# Patient Record
Sex: FEMALE | Race: WHITE
[De-identification: names, ages, dates, MRNs, and addresses within clinical notes are randomized per-mention and may not be internally consistent; named-entity substitution may affect disease eponyms.]

---

## 2018-07-13 VITALS — SYSTOLIC BLOOD PRESSURE: 176 MMHG | DIASTOLIC BLOOD PRESSURE: 101 MMHG

## 2018-07-18 ENCOUNTER — HOSPITAL ENCOUNTER (OUTPATIENT)
Dept: HOSPITAL 8 - OUT | Age: 58
Setting detail: OBSERVATION
LOS: 2 days | Discharge: HOME | End: 2018-07-20
Attending: SURGERY | Admitting: SURGERY
Payer: COMMERCIAL

## 2018-07-18 VITALS — HEIGHT: 68 IN | BODY MASS INDEX: 33.28 KG/M2 | WEIGHT: 219.58 LBS

## 2018-07-18 VITALS — DIASTOLIC BLOOD PRESSURE: 86 MMHG | SYSTOLIC BLOOD PRESSURE: 139 MMHG

## 2018-07-18 DIAGNOSIS — K44.9: Primary | ICD-10-CM

## 2018-07-18 DIAGNOSIS — R13.10: ICD-10-CM

## 2018-07-18 PROCEDURE — 96375 TX/PRO/DX INJ NEW DRUG ADDON: CPT

## 2018-07-18 PROCEDURE — S0028 INJECTION, FAMOTIDINE, 20 MG: HCPCS

## 2018-07-18 PROCEDURE — 96374 THER/PROPH/DIAG INJ IV PUSH: CPT

## 2018-07-18 PROCEDURE — 96372 THER/PROPH/DIAG INJ SC/IM: CPT

## 2018-07-18 PROCEDURE — G0378 HOSPITAL OBSERVATION PER HR: HCPCS

## 2018-07-18 PROCEDURE — 43282 LAP PARAESOPH HER RPR W/MESH: CPT

## 2018-07-18 PROCEDURE — 96376 TX/PRO/DX INJ SAME DRUG ADON: CPT

## 2018-07-18 RX ADMIN — PRAMIPEXOLE DIHYDROCHLORIDE SCH MG: 0.5 TABLET ORAL at 21:43

## 2018-07-18 RX ADMIN — HYDRALAZINE HYDROCHLORIDE PRN MG: 20 INJECTION INTRAMUSCULAR; INTRAVENOUS at 16:25

## 2018-07-18 RX ADMIN — SODIUM CHLORIDE, SODIUM LACTATE, POTASSIUM CHLORIDE, AND CALCIUM CHLORIDE SCH MLS/HR: .6; .31; .03; .02 INJECTION, SOLUTION INTRAVENOUS at 21:48

## 2018-07-18 RX ADMIN — HYDROMORPHONE HYDROCHLORIDE PRN MG: 1 INJECTION, SOLUTION INTRAMUSCULAR; INTRAVENOUS; SUBCUTANEOUS at 21:41

## 2018-07-18 RX ADMIN — GABAPENTIN SCH MG: 300 CAPSULE ORAL at 21:47

## 2018-07-18 RX ADMIN — FAMOTIDINE SCH MG: 10 INJECTION INTRAVENOUS at 21:41

## 2018-07-18 RX ADMIN — HYDRALAZINE HYDROCHLORIDE PRN MG: 20 INJECTION INTRAMUSCULAR; INTRAVENOUS at 16:43

## 2018-07-19 VITALS — DIASTOLIC BLOOD PRESSURE: 87 MMHG | SYSTOLIC BLOOD PRESSURE: 141 MMHG

## 2018-07-19 VITALS — DIASTOLIC BLOOD PRESSURE: 83 MMHG | SYSTOLIC BLOOD PRESSURE: 133 MMHG

## 2018-07-19 VITALS — DIASTOLIC BLOOD PRESSURE: 66 MMHG | SYSTOLIC BLOOD PRESSURE: 130 MMHG

## 2018-07-19 VITALS — SYSTOLIC BLOOD PRESSURE: 140 MMHG | DIASTOLIC BLOOD PRESSURE: 78 MMHG

## 2018-07-19 VITALS — SYSTOLIC BLOOD PRESSURE: 147 MMHG | DIASTOLIC BLOOD PRESSURE: 73 MMHG

## 2018-07-19 RX ADMIN — SODIUM CHLORIDE, SODIUM LACTATE, POTASSIUM CHLORIDE, AND CALCIUM CHLORIDE SCH MLS/HR: .6; .31; .03; .02 INJECTION, SOLUTION INTRAVENOUS at 21:59

## 2018-07-19 RX ADMIN — SODIUM CHLORIDE, SODIUM LACTATE, POTASSIUM CHLORIDE, AND CALCIUM CHLORIDE SCH MLS/HR: .6; .31; .03; .02 INJECTION, SOLUTION INTRAVENOUS at 13:30

## 2018-07-19 RX ADMIN — GABAPENTIN SCH MG: 300 CAPSULE ORAL at 21:58

## 2018-07-19 RX ADMIN — KETOROLAC TROMETHAMINE SCH MG: 30 INJECTION, SOLUTION INTRAMUSCULAR at 08:51

## 2018-07-19 RX ADMIN — FAMOTIDINE SCH MG: 10 INJECTION INTRAVENOUS at 21:58

## 2018-07-19 RX ADMIN — KETOROLAC TROMETHAMINE SCH MG: 30 INJECTION, SOLUTION INTRAMUSCULAR at 21:58

## 2018-07-19 RX ADMIN — HYDROMORPHONE HYDROCHLORIDE PRN MG: 1 INJECTION, SOLUTION INTRAMUSCULAR; INTRAVENOUS; SUBCUTANEOUS at 02:09

## 2018-07-19 RX ADMIN — HYDROMORPHONE HYDROCHLORIDE PRN MG: 2 TABLET ORAL at 17:24

## 2018-07-19 RX ADMIN — HYDROMORPHONE HYDROCHLORIDE PRN MG: 2 TABLET ORAL at 12:24

## 2018-07-19 RX ADMIN — ONDANSETRON PRN MG: 2 INJECTION, SOLUTION INTRAMUSCULAR; INTRAVENOUS at 05:37

## 2018-07-19 RX ADMIN — HYDROMORPHONE HYDROCHLORIDE PRN MG: 2 TABLET ORAL at 21:58

## 2018-07-19 RX ADMIN — ENOXAPARIN SODIUM SCH MG: 40 INJECTION SUBCUTANEOUS at 08:39

## 2018-07-19 RX ADMIN — HYDROMORPHONE HYDROCHLORIDE PRN MG: 2 TABLET ORAL at 08:37

## 2018-07-19 RX ADMIN — FAMOTIDINE SCH MG: 10 INJECTION INTRAVENOUS at 08:38

## 2018-07-19 RX ADMIN — PRAMIPEXOLE DIHYDROCHLORIDE SCH MG: 0.5 TABLET ORAL at 21:58

## 2018-07-19 RX ADMIN — SODIUM CHLORIDE, SODIUM LACTATE, POTASSIUM CHLORIDE, AND CALCIUM CHLORIDE SCH MLS/HR: .6; .31; .03; .02 INJECTION, SOLUTION INTRAVENOUS at 05:30

## 2018-07-19 RX ADMIN — KETOROLAC TROMETHAMINE SCH MG: 30 INJECTION, SOLUTION INTRAMUSCULAR at 14:47

## 2018-07-19 RX ADMIN — ONDANSETRON PRN MG: 2 INJECTION, SOLUTION INTRAMUSCULAR; INTRAVENOUS at 17:23

## 2018-07-20 VITALS — DIASTOLIC BLOOD PRESSURE: 65 MMHG | SYSTOLIC BLOOD PRESSURE: 120 MMHG

## 2018-07-20 VITALS — DIASTOLIC BLOOD PRESSURE: 86 MMHG | SYSTOLIC BLOOD PRESSURE: 136 MMHG

## 2018-07-20 RX ADMIN — ONDANSETRON PRN MG: 2 INJECTION, SOLUTION INTRAMUSCULAR; INTRAVENOUS at 07:40

## 2018-07-20 RX ADMIN — KETOROLAC TROMETHAMINE SCH MG: 30 INJECTION, SOLUTION INTRAMUSCULAR at 09:50

## 2018-07-20 RX ADMIN — KETOROLAC TROMETHAMINE SCH MG: 30 INJECTION, SOLUTION INTRAMUSCULAR at 03:14

## 2018-07-20 RX ADMIN — HYDROMORPHONE HYDROCHLORIDE PRN MG: 2 TABLET ORAL at 06:01

## 2018-07-20 RX ADMIN — SODIUM CHLORIDE, SODIUM LACTATE, POTASSIUM CHLORIDE, AND CALCIUM CHLORIDE SCH MLS/HR: .6; .31; .03; .02 INJECTION, SOLUTION INTRAVENOUS at 05:30

## 2018-07-20 RX ADMIN — ENOXAPARIN SODIUM SCH MG: 40 INJECTION SUBCUTANEOUS at 09:53

## 2018-07-20 RX ADMIN — FAMOTIDINE SCH MG: 10 INJECTION INTRAVENOUS at 09:54

## 2018-07-24 ENCOUNTER — HOSPITAL ENCOUNTER (OUTPATIENT)
Dept: HOSPITAL 8 - RAD | Age: 58
Discharge: HOME | End: 2018-07-24
Attending: SURGERY
Payer: COMMERCIAL

## 2018-07-24 DIAGNOSIS — J98.11: Primary | ICD-10-CM

## 2018-07-24 DIAGNOSIS — K57.30: ICD-10-CM

## 2018-07-24 PROCEDURE — 74241: CPT

## 2019-05-10 ENCOUNTER — HOSPITAL ENCOUNTER (OUTPATIENT)
Dept: RADIOLOGY | Facility: MEDICAL CENTER | Age: 59
End: 2019-05-10

## 2019-05-14 ENCOUNTER — ANESTHESIA EVENT (OUTPATIENT)
Dept: SURGERY | Facility: MEDICAL CENTER | Age: 59
End: 2019-05-14
Payer: COMMERCIAL

## 2019-05-15 ENCOUNTER — HOSPITAL ENCOUNTER (OUTPATIENT)
Facility: MEDICAL CENTER | Age: 59
End: 2019-05-16
Attending: SURGERY | Admitting: SURGERY
Payer: COMMERCIAL

## 2019-05-15 ENCOUNTER — ANESTHESIA (OUTPATIENT)
Dept: SURGERY | Facility: MEDICAL CENTER | Age: 59
End: 2019-05-15
Payer: COMMERCIAL

## 2019-05-15 DIAGNOSIS — G89.18 POSTOPERATIVE PAIN: ICD-10-CM

## 2019-05-15 LAB
ABO + RH BLD: NORMAL
ABO GROUP BLD: NORMAL
ANION GAP SERPL CALC-SCNC: 8 MMOL/L (ref 0–11.9)
BLD GP AB SCN SERPL QL: NORMAL
BUN SERPL-MCNC: 20 MG/DL (ref 8–22)
CALCIUM SERPL-MCNC: 9.2 MG/DL (ref 8.5–10.5)
CHLORIDE SERPL-SCNC: 107 MMOL/L (ref 96–112)
CO2 SERPL-SCNC: 27 MMOL/L (ref 20–33)
CREAT SERPL-MCNC: 1.06 MG/DL (ref 0.5–1.4)
ERYTHROCYTE [DISTWIDTH] IN BLOOD BY AUTOMATED COUNT: 41.5 FL (ref 35.9–50)
GLUCOSE SERPL-MCNC: 115 MG/DL (ref 65–99)
HCT VFR BLD AUTO: 41.4 % (ref 37–47)
HGB BLD-MCNC: 14.2 G/DL (ref 12–16)
MCH RBC QN AUTO: 31.5 PG (ref 27–33)
MCHC RBC AUTO-ENTMCNC: 34.3 G/DL (ref 33.6–35)
MCV RBC AUTO: 91.8 FL (ref 81.4–97.8)
PLATELET # BLD AUTO: 177 K/UL (ref 164–446)
PMV BLD AUTO: 11.1 FL (ref 9–12.9)
POTASSIUM SERPL-SCNC: 3.3 MMOL/L (ref 3.6–5.5)
RBC # BLD AUTO: 4.51 M/UL (ref 4.2–5.4)
RH BLD: NORMAL
SODIUM SERPL-SCNC: 142 MMOL/L (ref 135–145)
WBC # BLD AUTO: 7.1 K/UL (ref 4.8–10.8)

## 2019-05-15 PROCEDURE — 86850 RBC ANTIBODY SCREEN: CPT

## 2019-05-15 PROCEDURE — 96374 THER/PROPH/DIAG INJ IV PUSH: CPT

## 2019-05-15 PROCEDURE — 502571 HCHG PACK, LAP CHOLE: Performed by: SURGERY

## 2019-05-15 PROCEDURE — 160036 HCHG PACU - EA ADDL 30 MINS PHASE I: Performed by: SURGERY

## 2019-05-15 PROCEDURE — 700105 HCHG RX REV CODE 258: Performed by: PHYSICIAN ASSISTANT

## 2019-05-15 PROCEDURE — 502240 HCHG MISC OR SUPPLY RC 0272: Performed by: SURGERY

## 2019-05-15 PROCEDURE — C1781 MESH (IMPLANTABLE): HCPCS | Performed by: SURGERY

## 2019-05-15 PROCEDURE — 500868 HCHG NEEDLE, SURGI(VARES): Performed by: SURGERY

## 2019-05-15 PROCEDURE — 160048 HCHG OR STATISTICAL LEVEL 1-5: Performed by: SURGERY

## 2019-05-15 PROCEDURE — 86900 BLOOD TYPING SEROLOGIC ABO: CPT

## 2019-05-15 PROCEDURE — G0378 HOSPITAL OBSERVATION PER HR: HCPCS

## 2019-05-15 PROCEDURE — 86901 BLOOD TYPING SEROLOGIC RH(D): CPT

## 2019-05-15 PROCEDURE — 700101 HCHG RX REV CODE 250: Performed by: ANESTHESIOLOGY

## 2019-05-15 PROCEDURE — 96375 TX/PRO/DX INJ NEW DRUG ADDON: CPT

## 2019-05-15 PROCEDURE — 700111 HCHG RX REV CODE 636 W/ 250 OVERRIDE (IP): Performed by: PHYSICIAN ASSISTANT

## 2019-05-15 PROCEDURE — 36415 COLL VENOUS BLD VENIPUNCTURE: CPT

## 2019-05-15 PROCEDURE — 700105 HCHG RX REV CODE 258: Performed by: SURGERY

## 2019-05-15 PROCEDURE — 501570 HCHG TROCAR, SEPARATOR: Performed by: SURGERY

## 2019-05-15 PROCEDURE — 160041 HCHG SURGERY MINUTES - EA ADDL 1 MIN LEVEL 4: Performed by: SURGERY

## 2019-05-15 PROCEDURE — 93005 ELECTROCARDIOGRAM TRACING: CPT | Performed by: SURGERY

## 2019-05-15 PROCEDURE — 501577 HCHG TROCAR, STEP 11MM: Performed by: SURGERY

## 2019-05-15 PROCEDURE — 160002 HCHG RECOVERY MINUTES (STAT): Performed by: SURGERY

## 2019-05-15 PROCEDURE — 501664 HCHG TUBING, FILTER STRYKER: Performed by: SURGERY

## 2019-05-15 PROCEDURE — 700102 HCHG RX REV CODE 250 W/ 637 OVERRIDE(OP): Performed by: PHYSICIAN ASSISTANT

## 2019-05-15 PROCEDURE — 85027 COMPLETE CBC AUTOMATED: CPT

## 2019-05-15 PROCEDURE — 500522 HCHG ENDOSTITCH SUTURING DEVICE: Performed by: SURGERY

## 2019-05-15 PROCEDURE — 160035 HCHG PACU - 1ST 60 MINS PHASE I: Performed by: SURGERY

## 2019-05-15 PROCEDURE — 160029 HCHG SURGERY MINUTES - 1ST 30 MINS LEVEL 4: Performed by: SURGERY

## 2019-05-15 PROCEDURE — 501583 HCHG TROCAR, THRD CAN&SEAL 5X100: Performed by: SURGERY

## 2019-05-15 PROCEDURE — A6402 STERILE GAUZE <= 16 SQ IN: HCPCS | Performed by: SURGERY

## 2019-05-15 PROCEDURE — 96376 TX/PRO/DX INJ SAME DRUG ADON: CPT

## 2019-05-15 PROCEDURE — 700111 HCHG RX REV CODE 636 W/ 250 OVERRIDE (IP): Performed by: ANESTHESIOLOGY

## 2019-05-15 PROCEDURE — A9270 NON-COVERED ITEM OR SERVICE: HCPCS | Performed by: PHYSICIAN ASSISTANT

## 2019-05-15 PROCEDURE — 80048 BASIC METABOLIC PNL TOTAL CA: CPT

## 2019-05-15 PROCEDURE — 500521 HCHG ENDOSTITCH LOAD UNIT: Performed by: SURGERY

## 2019-05-15 PROCEDURE — 501838 HCHG SUTURE GENERAL: Performed by: SURGERY

## 2019-05-15 PROCEDURE — 160009 HCHG ANES TIME/MIN: Performed by: SURGERY

## 2019-05-15 DEVICE — MESH SURGICAL PHASIX SEPRA 7X10CM: Type: IMPLANTABLE DEVICE | Status: FUNCTIONAL

## 2019-05-15 RX ORDER — ZOLPIDEM TARTRATE 10 MG/1
10 TABLET ORAL
COMMUNITY

## 2019-05-15 RX ORDER — GABAPENTIN 300 MG/1
600 CAPSULE ORAL
COMMUNITY

## 2019-05-15 RX ORDER — GABAPENTIN 300 MG/1
600 CAPSULE ORAL
Status: DISCONTINUED | OUTPATIENT
Start: 2019-05-15 | End: 2019-05-16 | Stop reason: HOSPADM

## 2019-05-15 RX ORDER — FAMOTIDINE 20 MG/1
20 TABLET, FILM COATED ORAL 2 TIMES DAILY
Status: DISCONTINUED | OUTPATIENT
Start: 2019-05-15 | End: 2019-05-16 | Stop reason: HOSPADM

## 2019-05-15 RX ORDER — DIPHENHYDRAMINE HYDROCHLORIDE 50 MG/ML
12.5 INJECTION INTRAMUSCULAR; INTRAVENOUS
Status: DISCONTINUED | OUTPATIENT
Start: 2019-05-15 | End: 2019-05-15 | Stop reason: HOSPADM

## 2019-05-15 RX ORDER — HYDRALAZINE HYDROCHLORIDE 20 MG/ML
10 INJECTION INTRAMUSCULAR; INTRAVENOUS EVERY 6 HOURS PRN
Status: DISCONTINUED | OUTPATIENT
Start: 2019-05-15 | End: 2019-05-16 | Stop reason: HOSPADM

## 2019-05-15 RX ORDER — LORAZEPAM 2 MG/ML
0.5 INJECTION INTRAMUSCULAR
Status: DISCONTINUED | OUTPATIENT
Start: 2019-05-15 | End: 2019-05-15 | Stop reason: HOSPADM

## 2019-05-15 RX ORDER — HALOPERIDOL 5 MG/ML
1 INJECTION INTRAMUSCULAR
Status: DISCONTINUED | OUTPATIENT
Start: 2019-05-15 | End: 2019-05-15 | Stop reason: HOSPADM

## 2019-05-15 RX ORDER — ACETAMINOPHEN 10 MG/ML
1 INJECTION, SOLUTION INTRAVENOUS ONCE
Status: COMPLETED | OUTPATIENT
Start: 2019-05-15 | End: 2019-05-15

## 2019-05-15 RX ORDER — SODIUM CHLORIDE, SODIUM LACTATE, POTASSIUM CHLORIDE, CALCIUM CHLORIDE 600; 310; 30; 20 MG/100ML; MG/100ML; MG/100ML; MG/100ML
INJECTION, SOLUTION INTRAVENOUS CONTINUOUS
Status: DISCONTINUED | OUTPATIENT
Start: 2019-05-15 | End: 2019-05-15 | Stop reason: HOSPADM

## 2019-05-15 RX ORDER — HYDROMORPHONE HYDROCHLORIDE 1 MG/ML
0.4 INJECTION, SOLUTION INTRAMUSCULAR; INTRAVENOUS; SUBCUTANEOUS
Status: DISCONTINUED | OUTPATIENT
Start: 2019-05-15 | End: 2019-05-15 | Stop reason: HOSPADM

## 2019-05-15 RX ORDER — MORPHINE SULFATE 10 MG/ML
1-5 INJECTION, SOLUTION INTRAMUSCULAR; INTRAVENOUS
Status: DISCONTINUED | OUTPATIENT
Start: 2019-05-15 | End: 2019-05-16 | Stop reason: HOSPADM

## 2019-05-15 RX ORDER — PRAMIPEXOLE DIHYDROCHLORIDE 1 MG/1
2 TABLET ORAL
COMMUNITY

## 2019-05-15 RX ORDER — ENALAPRILAT 1.25 MG/ML
2.5 INJECTION INTRAVENOUS EVERY 6 HOURS PRN
Status: DISCONTINUED | OUTPATIENT
Start: 2019-05-15 | End: 2019-05-16 | Stop reason: HOSPADM

## 2019-05-15 RX ORDER — ONDANSETRON 2 MG/ML
INJECTION INTRAMUSCULAR; INTRAVENOUS PRN
Status: DISCONTINUED | OUTPATIENT
Start: 2019-05-15 | End: 2019-05-15 | Stop reason: SURG

## 2019-05-15 RX ORDER — ONDANSETRON 4 MG/1
4 TABLET, ORALLY DISINTEGRATING ORAL EVERY 4 HOURS PRN
Status: DISCONTINUED | OUTPATIENT
Start: 2019-05-15 | End: 2019-05-16 | Stop reason: HOSPADM

## 2019-05-15 RX ORDER — DEXMEDETOMIDINE HYDROCHLORIDE 100 UG/ML
INJECTION, SOLUTION INTRAVENOUS PRN
Status: DISCONTINUED | OUTPATIENT
Start: 2019-05-15 | End: 2019-05-15 | Stop reason: SURG

## 2019-05-15 RX ORDER — BUPIVACAINE HYDROCHLORIDE AND EPINEPHRINE 5; 5 MG/ML; UG/ML
INJECTION, SOLUTION EPIDURAL; INTRACAUDAL; PERINEURAL
Status: DISCONTINUED | OUTPATIENT
Start: 2019-05-15 | End: 2019-05-15 | Stop reason: HOSPADM

## 2019-05-15 RX ORDER — SODIUM CHLORIDE, SODIUM LACTATE, POTASSIUM CHLORIDE, CALCIUM CHLORIDE 600; 310; 30; 20 MG/100ML; MG/100ML; MG/100ML; MG/100ML
INJECTION, SOLUTION INTRAVENOUS CONTINUOUS
Status: DISCONTINUED | OUTPATIENT
Start: 2019-05-15 | End: 2019-05-15

## 2019-05-15 RX ORDER — PRAMIPEXOLE DIHYDROCHLORIDE 0.5 MG/1
2 TABLET ORAL
Status: DISCONTINUED | OUTPATIENT
Start: 2019-05-15 | End: 2019-05-16 | Stop reason: HOSPADM

## 2019-05-15 RX ORDER — HYDROMORPHONE HYDROCHLORIDE 1 MG/ML
0.1 INJECTION, SOLUTION INTRAMUSCULAR; INTRAVENOUS; SUBCUTANEOUS
Status: DISCONTINUED | OUTPATIENT
Start: 2019-05-15 | End: 2019-05-15 | Stop reason: HOSPADM

## 2019-05-15 RX ORDER — ACETAMINOPHEN 10 MG/ML
INJECTION, SOLUTION INTRAVENOUS PRN
Status: DISCONTINUED | OUTPATIENT
Start: 2019-05-15 | End: 2019-05-15 | Stop reason: SURG

## 2019-05-15 RX ORDER — SODIUM CHLORIDE, SODIUM LACTATE, POTASSIUM CHLORIDE, CALCIUM CHLORIDE 600; 310; 30; 20 MG/100ML; MG/100ML; MG/100ML; MG/100ML
INJECTION, SOLUTION INTRAVENOUS
Status: COMPLETED | OUTPATIENT
Start: 2019-05-15 | End: 2019-05-15

## 2019-05-15 RX ORDER — ONDANSETRON 2 MG/ML
4 INJECTION INTRAMUSCULAR; INTRAVENOUS
Status: COMPLETED | OUTPATIENT
Start: 2019-05-15 | End: 2019-05-15

## 2019-05-15 RX ORDER — DIPHENHYDRAMINE HYDROCHLORIDE 50 MG/ML
25 INJECTION INTRAMUSCULAR; INTRAVENOUS EVERY 6 HOURS PRN
Status: DISCONTINUED | OUTPATIENT
Start: 2019-05-15 | End: 2019-05-16 | Stop reason: HOSPADM

## 2019-05-15 RX ORDER — HYDROMORPHONE HYDROCHLORIDE 1 MG/ML
0.2 INJECTION, SOLUTION INTRAMUSCULAR; INTRAVENOUS; SUBCUTANEOUS
Status: DISCONTINUED | OUTPATIENT
Start: 2019-05-15 | End: 2019-05-15 | Stop reason: HOSPADM

## 2019-05-15 RX ORDER — LORAZEPAM 2 MG/ML
.5-1 INJECTION INTRAMUSCULAR EVERY 4 HOURS PRN
Status: DISCONTINUED | OUTPATIENT
Start: 2019-05-15 | End: 2019-05-16 | Stop reason: HOSPADM

## 2019-05-15 RX ORDER — LOSARTAN POTASSIUM AND HYDROCHLOROTHIAZIDE 12.5; 5 MG/1; MG/1
1 TABLET ORAL EVERY EVENING
COMMUNITY

## 2019-05-15 RX ORDER — ONDANSETRON 2 MG/ML
4 INJECTION INTRAMUSCULAR; INTRAVENOUS EVERY 4 HOURS PRN
Status: DISCONTINUED | OUTPATIENT
Start: 2019-05-15 | End: 2019-05-16 | Stop reason: HOSPADM

## 2019-05-15 RX ORDER — SCOLOPAMINE TRANSDERMAL SYSTEM 1 MG/1
1 PATCH, EXTENDED RELEASE TRANSDERMAL
Status: DISCONTINUED | OUTPATIENT
Start: 2019-05-15 | End: 2019-05-16 | Stop reason: HOSPADM

## 2019-05-15 RX ORDER — CEFAZOLIN SODIUM 1 G/3ML
INJECTION, POWDER, FOR SOLUTION INTRAMUSCULAR; INTRAVENOUS PRN
Status: DISCONTINUED | OUTPATIENT
Start: 2019-05-15 | End: 2019-05-15 | Stop reason: SURG

## 2019-05-15 RX ORDER — HYDROMORPHONE HYDROCHLORIDE 2 MG/ML
INJECTION, SOLUTION INTRAMUSCULAR; INTRAVENOUS; SUBCUTANEOUS PRN
Status: DISCONTINUED | OUTPATIENT
Start: 2019-05-15 | End: 2019-05-15 | Stop reason: SURG

## 2019-05-15 RX ORDER — DEXAMETHASONE SODIUM PHOSPHATE 4 MG/ML
INJECTION, SOLUTION INTRA-ARTICULAR; INTRALESIONAL; INTRAMUSCULAR; INTRAVENOUS; SOFT TISSUE PRN
Status: DISCONTINUED | OUTPATIENT
Start: 2019-05-15 | End: 2019-05-15 | Stop reason: SURG

## 2019-05-15 RX ORDER — SODIUM CHLORIDE, SODIUM LACTATE, POTASSIUM CHLORIDE, CALCIUM CHLORIDE 600; 310; 30; 20 MG/100ML; MG/100ML; MG/100ML; MG/100ML
INJECTION, SOLUTION INTRAVENOUS CONTINUOUS
Status: DISCONTINUED | OUTPATIENT
Start: 2019-05-15 | End: 2019-05-16 | Stop reason: HOSPADM

## 2019-05-15 RX ORDER — HYDROMORPHONE HYDROCHLORIDE 2 MG/ML
2 INJECTION, SOLUTION INTRAMUSCULAR; INTRAVENOUS; SUBCUTANEOUS
Status: ON HOLD | COMMUNITY
End: 2019-05-15

## 2019-05-15 RX ORDER — PROMETHAZINE HYDROCHLORIDE 25 MG/1
25 SUPPOSITORY RECTAL EVERY 6 HOURS PRN
Status: DISCONTINUED | OUTPATIENT
Start: 2019-05-15 | End: 2019-05-16 | Stop reason: HOSPADM

## 2019-05-15 RX ADMIN — SODIUM CHLORIDE, POTASSIUM CHLORIDE, SODIUM LACTATE AND CALCIUM CHLORIDE: 600; 310; 30; 20 INJECTION, SOLUTION INTRAVENOUS at 14:13

## 2019-05-15 RX ADMIN — DEXMEDETOMIDINE HYDROCHLORIDE 10 MCG: 100 INJECTION, SOLUTION INTRAVENOUS at 15:18

## 2019-05-15 RX ADMIN — HYDROCODONE BITARTRATE AND ACETAMINOPHEN 20 ML: 7.5; 325 SOLUTION ORAL at 20:14

## 2019-05-15 RX ADMIN — HYDROMORPHONE HYDROCHLORIDE 1 MG: 2 INJECTION, SOLUTION INTRAMUSCULAR; INTRAVENOUS; SUBCUTANEOUS at 15:22

## 2019-05-15 RX ADMIN — ONDANSETRON 4 MG: 2 INJECTION INTRAMUSCULAR; INTRAVENOUS at 15:51

## 2019-05-15 RX ADMIN — MIDAZOLAM HYDROCHLORIDE 2 MG: 1 INJECTION, SOLUTION INTRAMUSCULAR; INTRAVENOUS at 14:09

## 2019-05-15 RX ADMIN — FENTANYL CITRATE 50 MCG: 50 INJECTION, SOLUTION INTRAMUSCULAR; INTRAVENOUS at 14:35

## 2019-05-15 RX ADMIN — ROCURONIUM BROMIDE 20 MG: 10 INJECTION, SOLUTION INTRAVENOUS at 15:25

## 2019-05-15 RX ADMIN — DEXAMETHASONE SODIUM PHOSPHATE 8 MG: 4 INJECTION, SOLUTION INTRA-ARTICULAR; INTRALESIONAL; INTRAMUSCULAR; INTRAVENOUS; SOFT TISSUE at 14:45

## 2019-05-15 RX ADMIN — FENTANYL CITRATE 100 MCG: 50 INJECTION, SOLUTION INTRAMUSCULAR; INTRAVENOUS at 14:17

## 2019-05-15 RX ADMIN — PROPOFOL 100 MG: 10 INJECTION, EMULSION INTRAVENOUS at 14:50

## 2019-05-15 RX ADMIN — HYDROMORPHONE HYDROCHLORIDE 0.4 MG: 1 INJECTION, SOLUTION INTRAMUSCULAR; INTRAVENOUS; SUBCUTANEOUS at 16:47

## 2019-05-15 RX ADMIN — ONDANSETRON 4 MG: 2 INJECTION INTRAMUSCULAR; INTRAVENOUS at 16:47

## 2019-05-15 RX ADMIN — CEFAZOLIN 2 G: 330 INJECTION, POWDER, FOR SOLUTION INTRAMUSCULAR; INTRAVENOUS at 14:22

## 2019-05-15 RX ADMIN — GABAPENTIN 600 MG: 300 CAPSULE ORAL at 20:15

## 2019-05-15 RX ADMIN — FAMOTIDINE 20 MG: 10 INJECTION INTRAVENOUS at 18:23

## 2019-05-15 RX ADMIN — MORPHINE SULFATE 5 MG: 10 INJECTION INTRAVENOUS at 22:53

## 2019-05-15 RX ADMIN — DEXMEDETOMIDINE HYDROCHLORIDE 5 MCG: 100 INJECTION, SOLUTION INTRAVENOUS at 15:35

## 2019-05-15 RX ADMIN — EPHEDRINE SULFATE 10 MG: 50 INJECTION INTRAMUSCULAR; INTRAVENOUS; SUBCUTANEOUS at 14:25

## 2019-05-15 RX ADMIN — SODIUM CHLORIDE, POTASSIUM CHLORIDE, SODIUM LACTATE AND CALCIUM CHLORIDE: 600; 310; 30; 20 INJECTION, SOLUTION INTRAVENOUS at 18:28

## 2019-05-15 RX ADMIN — ACETAMINOPHEN 1 G: 10 INJECTION, SOLUTION INTRAVENOUS at 14:30

## 2019-05-15 RX ADMIN — FENTANYL CITRATE 50 MCG: 50 INJECTION, SOLUTION INTRAMUSCULAR; INTRAVENOUS at 15:00

## 2019-05-15 RX ADMIN — PRAMIPEXOLE DIHYDROCHLORIDE 2 MG: 0.5 TABLET ORAL at 20:15

## 2019-05-15 RX ADMIN — ACETAMINOPHEN 1 G: 10 INJECTION, SOLUTION INTRAVENOUS at 12:58

## 2019-05-15 RX ADMIN — LIDOCAINE HYDROCHLORIDE 60 MG: 20 INJECTION, SOLUTION INTRAVENOUS at 14:17

## 2019-05-15 RX ADMIN — LORAZEPAM 1 MG: 2 INJECTION INTRAMUSCULAR at 21:34

## 2019-05-15 RX ADMIN — PROPOFOL 200 MG: 10 INJECTION, EMULSION INTRAVENOUS at 14:17

## 2019-05-15 RX ADMIN — HYDROMORPHONE HYDROCHLORIDE 0.2 MG: 1 INJECTION, SOLUTION INTRAMUSCULAR; INTRAVENOUS; SUBCUTANEOUS at 16:53

## 2019-05-15 RX ADMIN — SUGAMMADEX 200 MG: 100 INJECTION, SOLUTION INTRAVENOUS at 16:00

## 2019-05-15 RX ADMIN — ROCURONIUM BROMIDE 50 MG: 10 INJECTION, SOLUTION INTRAVENOUS at 14:17

## 2019-05-15 RX ADMIN — MORPHINE SULFATE 5 MG: 10 INJECTION INTRAVENOUS at 18:24

## 2019-05-15 RX ADMIN — FENTANYL CITRATE 50 MCG: 50 INJECTION, SOLUTION INTRAMUSCULAR; INTRAVENOUS at 15:10

## 2019-05-15 RX ADMIN — DIPHENHYDRAMINE HYDROCHLORIDE 25 MG: 50 INJECTION INTRAMUSCULAR; INTRAVENOUS at 18:23

## 2019-05-15 RX ADMIN — SODIUM CHLORIDE, POTASSIUM CHLORIDE, SODIUM LACTATE AND CALCIUM CHLORIDE: 600; 310; 30; 20 INJECTION, SOLUTION INTRAVENOUS at 12:00

## 2019-05-15 RX ADMIN — DEXMEDETOMIDINE HYDROCHLORIDE 10 MCG: 100 INJECTION, SOLUTION INTRAVENOUS at 15:25

## 2019-05-15 RX ADMIN — HYDROMORPHONE HYDROCHLORIDE 0.4 MG: 1 INJECTION, SOLUTION INTRAMUSCULAR; INTRAVENOUS; SUBCUTANEOUS at 16:42

## 2019-05-15 ASSESSMENT — COGNITIVE AND FUNCTIONAL STATUS - GENERAL
SUGGESTED CMS G CODE MODIFIER MOBILITY: CK
HELP NEEDED FOR BATHING: A LITTLE
STANDING UP FROM CHAIR USING ARMS: A LITTLE
MOBILITY SCORE: 18
WALKING IN HOSPITAL ROOM: A LITTLE
SUGGESTED CMS G CODE MODIFIER DAILY ACTIVITY: CK
CLIMB 3 TO 5 STEPS WITH RAILING: A LITTLE
MOVING TO AND FROM BED TO CHAIR: A LITTLE
MOVING FROM LYING ON BACK TO SITTING ON SIDE OF FLAT BED: A LITTLE
DAILY ACTIVITIY SCORE: 18
TURNING FROM BACK TO SIDE WHILE IN FLAT BAD: A LITTLE
DRESSING REGULAR UPPER BODY CLOTHING: A LITTLE
EATING MEALS: A LITTLE
TOILETING: A LITTLE
DRESSING REGULAR LOWER BODY CLOTHING: A LITTLE
PERSONAL GROOMING: A LITTLE

## 2019-05-15 ASSESSMENT — PATIENT HEALTH QUESTIONNAIRE - PHQ9
1. LITTLE INTEREST OR PLEASURE IN DOING THINGS: NOT AT ALL
SUM OF ALL RESPONSES TO PHQ9 QUESTIONS 1 AND 2: 0
2. FEELING DOWN, DEPRESSED, IRRITABLE, OR HOPELESS: NOT AT ALL

## 2019-05-15 ASSESSMENT — LIFESTYLE VARIABLES
ALCOHOL_USE: NO
EVER_SMOKED: YES

## 2019-05-15 ASSESSMENT — PAIN SCALES - GENERAL: PAIN_LEVEL: 5

## 2019-05-15 NOTE — ANESTHESIA PROCEDURE NOTES
Airway  Date/Time: 5/15/2019 2:24 PM  Performed by: JULIANE NELSON  Authorized by: JULIANE NELSON     Location:  OR  Urgency:  Elective  Difficult Airway: No    Indications for Airway Management:  Anesthesia  Spontaneous Ventilation: absent    Sedation Level:  Deep  Preoxygenated: Yes    Patient Position:  Sniffing  Mask Difficulty Assessment:  1 - vent by mask  Final Airway Type:  Endotracheal airway  Final Endotracheal Airway:  ETT  Cuffed: Yes    Technique Used for Successful ETT Placement:  Direct laryngoscopy  Blade Type:  Pham  Laryngoscope Blade/Videolaryngoscope Blade Size:  2  ETT Size (mm):  7.0  Measured from:  Teeth  ETT to Teeth (cm):  22  Placement Verified by: capnometry    Cormack-Lehane Classification:  Grade I - full view of glottis  Number of Attempts at Approach:  2  Ventilation Between Attempts:  BVM  Number of Other Approaches Attempted:  0

## 2019-05-15 NOTE — OR SURGEON
Immediate Post OP Note    PreOp Diagnosis: Recurrent paraesophageal hiatal hernia, dysphagia    PostOp Diagnosis: Same    Procedure(s):  LAPAROSCOPIC- RECURRENT PARAESOPHAGEAL HIATAL HERNIA WITH MESH - Wound Class: Clean    Surgeon(s):  John H Ganser, M.D.    Anesthesiologist/Type of Anesthesia:  Anesthesiologist: Maureen Grier M.D./General    Surgical Staff:  Circulator: Johanne Gomez, Student; Cait Gamez R.N.  Relief Circulator: Arely Hodge R.N.  Scrub Person: Julia Linares Assist: Cr Norton PSpeedyASpeedy    Specimens removed if any:  * No specimens in log *    Estimated Blood Loss: 0    Findings: 0    Complications: 0        5/15/2019 3:54 PM John H Ganser, M.D.

## 2019-05-15 NOTE — ANESTHESIA QCDR
2019 [facility] Clinical Data Registry (for Quality Improvement)     Postoperative nausea/vomiting risk protocol (Adult = 18 yrs and Pediatric 3-17 yrs)- (430 and 463)  General inhalation anesthetic (NOT TIVA) with PONV risk factors: Yes  Provision of anti-emetic therapy with at least 2 different classes of agents: Yes   Patient DID NOT receive anti-emetic therapy and reason is documented in Medical Record:  N/A    Multimodal Pain Management- (AQI59)  Patient undergoing Elective Surgery (i.e. Outpatient, or ASC, or Prescheduled Surgery prior to Hospital Admission): Yes  Use of Multimodal Pain Management, two or more drugs and/or interventions, NOT including systemic opioids: Yes   Exception: Documented allergy to multiple classes of analgesics:  N/A    PACU assessment of acute postoperative pain prior to Anesthesia Care End- Applies to Patients Age = 18- (ABG7)  Initial PACU pain score is which of the following: < 7/10  Patient unable to report pain score: N/A    Post-anesthetic transfer of care checklist/protocol to PACU/ICU- (426 and 427)  Upon conclusion of case, patient transferred to which of the following locations: PACU/Non-ICU  Use of transfer checklist/protocol: Yes  Exclusion: Service Performed in Patient Hospital Room (and thus did not require transfer): N/A    PACU Reintubation- (AQI31)  General anesthesia requiring endotracheal intubation (ETT) along with subsequent extubation in OR or PACU: Yes  Required reintubation in the PACU: No   Extubation was a planned trial documented in the medical record prior to removal of the original airway device:  N/A    Unplanned admission to ICU related to anesthesia service up through end of PACU care- (MD51)  Unplanned admission to ICU (not initially anticipated at anesthesia start time): No

## 2019-05-15 NOTE — ANESTHESIA TIME REPORT
Anesthesia Start and Stop Event Times     Date Time Event    5/15/2019 1412 Anesthesia Start        Responsible Staff  05/15/19    Name Role Begin End    Maureen Grier M.D. Anesth 1412         Preop Diagnosis (Free Text):  Pre-op Diagnosis     DYSPHAGIA, MULTIPLY RECURRETN PARAESOPHAGEAL HIATAL HERNIA        Preop Diagnosis (Codes):  Diagnosis Information     Diagnosis Code(s):         Post op Diagnosis  Hiatal hernia      Premium Reason  A. 3PM - 7AM    Comments:

## 2019-05-15 NOTE — ANESTHESIA PREPROCEDURE EVALUATION
Relevant Problems   No relevant active problems       Physical Exam    Airway   Mallampati: II  TM distance: >3 FB  Neck ROM: full       Cardiovascular   Rhythm: regular  Rate: normal     Dental    Pulmonary   (+) decreased breath sounds     Abdominal    Neurological              Anesthesia Plan    ASA 2       Plan - general       Airway plan will be ETT      Plan Factors:   Patient was previously instructed to abstain from smoking on day of procedure.  Patient did not smoke on day of procedure.    Induction: intravenous      Pertinent diagnostic labs and testing reviewed    Informed Consent:    Anesthetic plan and risks discussed with patient.    Use of blood products discussed with: patient whom.

## 2019-05-15 NOTE — ANESTHESIA POSTPROCEDURE EVALUATION
Patient: Susan Parker    Procedure Summary     Date:  05/15/19 Room / Location:  [facility] 08 / SURGERY [facility]    Anesthesia Start:  1412 Anesthesia Stop:  1613    Procedure:  FUNDOPLICATION, NISSEN, LAPAROSCOPIC- RECURRENT PARAESOPHAGEAL HIATAL HERNIA WITH MESH (Abdomen) Diagnosis:  (DYSPHAGIA,  RECURRENT  PARAESOPHAGEAL HIATAL HERNIA)    Surgeon:  John H Ganser, M.D. Responsible Provider:  Maureen Grier M.D.    Anesthesia Type:  general ASA Status:  2          Final Anesthesia Type: general  Last vitals  BP   Blood Pressure: 112/70, NIBP: 133/75    Temp   37.1 °C (98.8 °F)    Pulse   Pulse: 88, Heart Rate (Monitored): 87   Resp   18    SpO2   91 %      Anesthesia Post Evaluation    Patient location during evaluation: bedside  Patient participation: complete - patient participated  Level of consciousness: sleepy but conscious, responsive to verbal stimuli and awake  Pain score: 5    Airway patency: patent  Anesthetic complications: no  Cardiovascular status: adequate and hemodynamically stable  Respiratory status: acceptable, face mask and spontaneous ventilation  Hydration status: acceptable    PONV: none           Nurse Pain Score: 6 (NPRS)

## 2019-05-16 VITALS
DIASTOLIC BLOOD PRESSURE: 68 MMHG | HEART RATE: 61 BPM | BODY MASS INDEX: 30.3 KG/M2 | SYSTOLIC BLOOD PRESSURE: 116 MMHG | WEIGHT: 204.59 LBS | RESPIRATION RATE: 18 BRPM | TEMPERATURE: 97.8 F | OXYGEN SATURATION: 93 % | HEIGHT: 69 IN

## 2019-05-16 LAB — EKG IMPRESSION: NORMAL

## 2019-05-16 PROCEDURE — 700105 HCHG RX REV CODE 258: Performed by: PHYSICIAN ASSISTANT

## 2019-05-16 PROCEDURE — 700111 HCHG RX REV CODE 636 W/ 250 OVERRIDE (IP): Performed by: SURGERY

## 2019-05-16 PROCEDURE — A9270 NON-COVERED ITEM OR SERVICE: HCPCS | Performed by: PHYSICIAN ASSISTANT

## 2019-05-16 PROCEDURE — A9270 NON-COVERED ITEM OR SERVICE: HCPCS | Performed by: SURGERY

## 2019-05-16 PROCEDURE — 700102 HCHG RX REV CODE 250 W/ 637 OVERRIDE(OP): Performed by: SURGERY

## 2019-05-16 PROCEDURE — 94760 N-INVAS EAR/PLS OXIMETRY 1: CPT

## 2019-05-16 PROCEDURE — G0378 HOSPITAL OBSERVATION PER HR: HCPCS

## 2019-05-16 PROCEDURE — 700102 HCHG RX REV CODE 250 W/ 637 OVERRIDE(OP): Performed by: PHYSICIAN ASSISTANT

## 2019-05-16 PROCEDURE — 96376 TX/PRO/DX INJ SAME DRUG ADON: CPT

## 2019-05-16 PROCEDURE — 700111 HCHG RX REV CODE 636 W/ 250 OVERRIDE (IP): Performed by: PHYSICIAN ASSISTANT

## 2019-05-16 PROCEDURE — 93010 ELECTROCARDIOGRAM REPORT: CPT | Performed by: INTERNAL MEDICINE

## 2019-05-16 PROCEDURE — 96375 TX/PRO/DX INJ NEW DRUG ADDON: CPT

## 2019-05-16 RX ORDER — HYDROMORPHONE HYDROCHLORIDE 2 MG/1
2-4 TABLET ORAL EVERY 4 HOURS PRN
Status: DISCONTINUED | OUTPATIENT
Start: 2019-05-16 | End: 2019-05-16 | Stop reason: HOSPADM

## 2019-05-16 RX ORDER — HYDROMORPHONE HYDROCHLORIDE 2 MG/1
2-4 TABLET ORAL EVERY 4 HOURS PRN
Qty: 25 TAB | Refills: 0 | Status: SHIPPED | OUTPATIENT
Start: 2019-05-16 | End: 2019-05-19

## 2019-05-16 RX ORDER — KETOROLAC TROMETHAMINE 30 MG/ML
30 INJECTION, SOLUTION INTRAMUSCULAR; INTRAVENOUS EVERY 6 HOURS PRN
Status: DISCONTINUED | OUTPATIENT
Start: 2019-05-16 | End: 2019-05-16 | Stop reason: HOSPADM

## 2019-05-16 RX ADMIN — SODIUM CHLORIDE, POTASSIUM CHLORIDE, SODIUM LACTATE AND CALCIUM CHLORIDE: 600; 310; 30; 20 INJECTION, SOLUTION INTRAVENOUS at 03:00

## 2019-05-16 RX ADMIN — KETOROLAC TROMETHAMINE 30 MG: 30 INJECTION, SOLUTION INTRAMUSCULAR; INTRAVENOUS at 09:58

## 2019-05-16 RX ADMIN — HYDROMORPHONE HYDROCHLORIDE 2 MG: 2 TABLET ORAL at 09:58

## 2019-05-16 RX ADMIN — DIPHENHYDRAMINE HYDROCHLORIDE 25 MG: 50 INJECTION INTRAMUSCULAR; INTRAVENOUS at 04:01

## 2019-05-16 RX ADMIN — HYDROCODONE BITARTRATE AND ACETAMINOPHEN 15 ML: 7.5; 325 SOLUTION ORAL at 06:59

## 2019-05-16 RX ADMIN — FAMOTIDINE 20 MG: 20 TABLET ORAL at 05:06

## 2019-05-16 RX ADMIN — ONDANSETRON 4 MG: 2 INJECTION INTRAMUSCULAR; INTRAVENOUS at 02:59

## 2019-05-16 RX ADMIN — HYDROCODONE BITARTRATE AND ACETAMINOPHEN 20 ML: 7.5; 325 SOLUTION ORAL at 02:59

## 2019-05-16 RX ADMIN — ENOXAPARIN SODIUM 40 MG: 100 INJECTION SUBCUTANEOUS at 09:06

## 2019-05-16 ASSESSMENT — LIFESTYLE VARIABLES: EVER_SMOKED: YES

## 2019-05-16 ASSESSMENT — COPD QUESTIONNAIRES
DO YOU EVER COUGH UP ANY MUCUS OR PHLEGM?: NO/ONLY WITH OCCASIONAL COLDS OR INFECTIONS
HAVE YOU SMOKED AT LEAST 100 CIGARETTES IN YOUR ENTIRE LIFE: YES
COPD SCREENING SCORE: 5
DURING THE PAST 4 WEEKS HOW MUCH DID YOU FEEL SHORT OF BREATH: SOME OF THE TIME

## 2019-05-16 NOTE — PROGRESS NOTES
"Progress Note:    S: Doing well  Tolerating clear liquids without difficulty  C/O left shoulder pain    O:  Recent Labs      05/15/19   1220   WBC  7.1   RBC  4.51   HEMOGLOBIN  14.2   HEMATOCRIT  41.4   MCV  91.8   MCH  31.5   MCHC  34.3   RDW  41.5   PLATELETCT  177   MPV  11.1     Recent Labs      05/15/19   1220   SODIUM  142   POTASSIUM  3.3*   CHLORIDE  107   CO2  27   GLUCOSE  115*   BUN  20   CREATININE  1.06   CALCIUM  9.2         Current Facility-Administered Medications   Medication Dose   • gabapentin (NEURONTIN) capsule 600 mg  600 mg   • pramipexole (MIRAPEX) tablet 2 mg  2 mg   • lactated ringers infusion     • enoxaparin (LOVENOX) inj 40 mg  40 mg   • Pharmacy Consult Request ...Pain Management Review 1 Each  1 Each   • ondansetron (ZOFRAN) syringe/vial injection 4 mg  4 mg   • diphenhydrAMINE (BENADRYL) injection 25 mg  25 mg   • scopolamine (TRANSDERM-SCOP) patch 1 Patch  1 Patch   • benzocaine-menthol (CEPACOL) lozenge 1 Lozenge  1 Lozenge   • Respiratory Care per Protocol     • enalaprilat (VASOTEC) injection 2.5 mg  2.5 mg   • famotidine (PEPCID) tablet 20 mg  20 mg    Or   • famotidine (PEPCID) injection 20 mg  20 mg   • hydrALAZINE (APRESOLINE) injection 10 mg  10 mg   • HYDROcodone-acetaminophen 2.5-108 mg/5mL (HYCET) solution 10-20 mL  10-20 mL   • LORazepam (ATIVAN) injection 0.5-1 mg  0.5-1 mg   • morphine (pf) 10 mg/mL injection 1-5 mg  1-5 mg   • ondansetron (ZOFRAN ODT) dispertab 4 mg  4 mg   • promethazine (PHENERGAN) suppository 25 mg  25 mg       PE:  /60   Pulse 63   Temp 36.4 °C (97.6 °F) (Temporal)   Resp 16   Ht 1.753 m (5' 9\")   Wt 92.8 kg (204 lb 9.4 oz)   SpO2 99%     Intake/Output Summary (Last 24 hours) at 05/16/19 0903  Last data filed at 05/16/19 0500   Gross per 24 hour   Intake             2600 ml   Output              770 ml   Net             1830 ml       Abd soft  Wnds dry    Rads:  No orders to display       A: There are no active hospital problems to " display for this patient.        P: PO Dilaudid  Toradol  Ambulate/IS  Possible discharge later today    John Ganser M.D.  [facility]

## 2019-05-16 NOTE — PROGRESS NOTES
"Pt admitted to the floor.  Full assessment complete.  Pt has 7/10 pain and + nausea. Awaiting approval of medications from pharmacy. Pt and pt family educated.    2 RN skin check with WILBERT Rao:  5x lap site with gauze and transparent film.   Dark healing round scab to left back r/t mole removal per pt.  Slight burn to top of right ear r/t \"curling iron burn\" per pt.   Slight bruising to left locker back.   "

## 2019-05-16 NOTE — PROGRESS NOTES
D/C'd.  Discharge instructions provided to pt.  Pt states understanding.  Pt states all questions have been answered.  Copy of discharge provided to pt.  Signed copy in chart.  Prescriptions provided to pt, copy in chart. Pt states that all personal belongings are in possession.   Pt escorted off unit with assistance from CNA via wheelchair at 1325 without incident.

## 2019-05-16 NOTE — CARE PLAN
Problem: Safety  Goal: Will remain free from injury  Outcome: PROGRESSING AS EXPECTED  Treaded socks in place, bed in the lowest position, call light and belongings within reach, pt call for assistance appropriately    Problem: Venous Thromboembolism (VTW)/Deep Vein Thrombosis (DVT) Prevention:  Goal: Patient will participate in Venous Thrombosis (VTE)/Deep Vein Thrombosis (DVT)Prevention Measures  Outcome: PROGRESSING AS EXPECTED  scds on, receiving lovenox    Problem: Pain Management  Goal: Pain level will decrease to patient's comfort goal  Outcome: PROGRESSING AS EXPECTED  Medicated per MAR  With adequate pain control, hourly rounding in progress

## 2019-05-16 NOTE — PROGRESS NOTES
Assumed care at 1900    Received report from day shift RN.    Reviewed recent lab results, notes, orders, and MAR  POC discussed and updated on care board  Bed is in the lowest and locked position, call light within reach      5 lap sites to abdomen  C/o pain medicated per MAR  Patient given IS and educated about using it.  SCD's connected.

## 2019-05-16 NOTE — CARE PLAN
Problem: Safety  Goal: Will remain free from injury  Outcome: PROGRESSING AS EXPECTED  Fall precautions in place    Problem: Pain Management  Goal: Pain level will decrease to patient's comfort goal  Outcome: PROGRESSING AS EXPECTED  Pain well controlled with medication per MAR    Problem: Respiratory:  Goal: Respiratory status will improve  Outcome: PROGRESSING AS EXPECTED  Patient given IS and educated on using it 10x an hour while awake.

## 2019-05-16 NOTE — PROGRESS NOTES
Report received. Assumed care. Pt in bed awake. Family at bedside. A/O x4. VSS. Responds appropriately. C/O pain, medicated per MAR, no SOB. Assessment complete. 5x lap site incision with gauze dressings with scant serosanguaneous drainage. Discussed POC, pain control, mobility, safety, DC planning , pt verbalizes understanding. Explained importance of calling before getting OOB. Call light and belongings within reach.  Bed in the lowest position. Treaded socks in place. Hourly rounding in progress. Will continue to monitor .

## 2019-05-16 NOTE — CARE PLAN
Problem: Pain Management  Goal: Pain level will decrease to patient's comfort goal    Intervention: Follow pain managment plan developed in collaboration with patient and Interdisciplinary Team  Pt assessed for pain upon admission to the floor using 1-10 scale.       Problem: Fluid Volume:  Goal: Will maintain balanced intake and output    Intervention: Monitor, educate, and encourage compliance with therapeutic intake of liquids  IVF running till pt intake is adequate PO

## 2019-05-16 NOTE — OR NURSING
PT from OR w/ ANES/RN, Pt initially w/ c/o not able to catch her breath, sats on 4L O2 via NC low 90's, PT mouth breathing and requested to sit upright, placed on Oxymask due to mouth breathing, sats now 97%.  PT medicated for 8/10 pain, mostly in shoulder area, no at much is incisions, PT advised of pain meds not effective w/ pain related to CO2 inflation in OR, PT verbalized understanding, medicated w/ Dilaudid 1mg and Zofran 4mg for some mild nausea.  PT tolerating ice chips, belongings obtained and placed at bedside,  Davin updated on status.  PT w/ 5 lap stab sites dressed w/ dry 4x4 and tegaderms, ice applied to abdomen.   Will continue to monitor and call report once bed has been assigned.

## 2019-05-16 NOTE — DISCHARGE INSTRUCTIONS
Discharge Instructions    Discharged to home by car with relative. Discharged via wheelchair, hospital escort: Yes.  Special equipment needed: Not Applicable    Be sure to schedule a follow-up appointment with your primary care doctor or any specialists as instructed.     Discharge Plan:   Smoking Cessation Offered: Patient Refused  Influenza Vaccine Indication: Patient Refuses    I understand that a diet low in cholesterol, fat, and sodium is recommended for good health. Unless I have been given specific instructions below for another diet, I accept this instruction as my diet prescription.   Other diet: Full liquid diet for 1 week    Special Instructions:     OK to shower   Remove bandages in 4-5 days   Full liquid type diet for 1 week then soft   Phone appointment I 1 week        · Is patient discharged on Warfarin / Coumadin?   No       Hiatal Hernia  A hiatal hernia occurs when part of your stomach slides above the muscle that separates your abdomen from your chest (diaphragm). You can be born with a hiatal hernia (congenital), or it may develop over time. In almost all cases of hiatal hernia, only the top part of the stomach pushes through.   Many people have a hiatal hernia with no symptoms. The larger the hernia, the more likely that you will have symptoms. In some cases, a hiatal hernia allows stomach acid to flow back into the tube that carries food from your mouth to your stomach (esophagus). This may cause heartburn symptoms. Severe heartburn symptoms may mean you have developed a condition called gastroesophageal reflux disease (GERD).   CAUSES   Hiatal hernias are caused by a weakness in the opening (hiatus) where your esophagus passes through your diaphragm to attach to the upper part of your stomach. You may be born with a weakness in your hiatus, or a weakness can develop.  RISK FACTORS  Older age is a major risk factor for a hiatal hernia. Anything that increases pressure on your diaphragm can also  increase your risk of a hiatal hernia. This includes:  · Pregnancy.  · Excess weight.  · Frequent constipation.  SIGNS AND SYMPTOMS   People with a hiatal hernia often have no symptoms. If symptoms develop, they are almost always caused by GERD. They may include:  · Heartburn.  · Belching.  · Indigestion.  · Trouble swallowing.  · Coughing or wheezing.   · Sore throat.  · Hoarseness.  · Chest pain.  DIAGNOSIS   A hiatal hernia is sometimes found during an exam for another problem. Your health care provider may suspect a hiatal hernia if you have symptoms of GERD. Tests may be done to diagnose GERD. These may include:  · X-rays of your stomach or chest.  · An upper gastrointestinal (GI) series. This is an X-ray exam of your GI tract involving the use of a chalky liquid that you swallow. The liquid shows up clearly on the X-ray.  · Endoscopy. This is a procedure to look into your stomach using a thin, flexible tube that has a tiny camera and light on the end of it.  TREATMENT   If you have no symptoms, you may not need treatment. If you have symptoms, treatment may include:  · Dietary and lifestyle changes to help reduce GERD symptoms.  · Medicines. These may include:  ¨ Over-the-counter antacids.  ¨ Medicines that make your stomach empty more quickly.  ¨ Medicines that block the production of stomach acid (H2 blockers).  ¨ Stronger medicines to reduce stomach acid (proton pump inhibitors).  · You may need surgery to repair the hernia if other treatments are not helping.  HOME CARE INSTRUCTIONS   · Take all medicines as directed by your health care provider.  · Quit smoking, if you smoke.  · Try to achieve and maintain a healthy body weight.  · Eat frequent small meals instead of three large meals a day. This keeps your stomach from getting too full.  ¨ Eat slowly.  ¨ Do not lie down right after eating.   ¨ Do not eat 1-2 hours before bed.    · Do not drink beverages with caffeine. These include cola, coffee, cocoa,  and tea.  · Do not drink alcohol.  · Avoid foods that can make symptoms of GERD worse. These may include:  ¨ Fatty foods.  ¨ Citrus fruits.  ¨ Other foods and drinks that contain acid.  · Avoid putting pressure on your belly. Anything that puts pressure on your belly increases the amount of acid that may be pushed up into your esophagus.    ¨ Avoid bending over, especially after eating.  ¨ Raise the head of your bed by putting blocks under the legs. This keeps your head and esophagus higher than your stomach.  ¨ Do not wear tight clothing around your chest or stomach.  ¨ Try not to strain when having a bowel movement, when urinating, or when lifting heavy objects.  SEEK MEDICAL CARE IF:  · Your symptoms are not controlled with medicines or lifestyle changes.  · You are having trouble swallowing.  · You have coughing or wheezing that will not go away.  SEEK IMMEDIATE MEDICAL CARE IF:  · Your pain is getting worse.  · Your pain spreads to your arms, neck, jaw, teeth, or back.  · You have shortness of breath.  · You sweat for no reason.  · You feel sick to your stomach (nauseous) or vomit.  · You vomit blood.  · You have bright red blood in your stools.  · You have black, tarry stools.    This information is not intended to replace advice given to you by your health care provider. Make sure you discuss any questions you have with your health care provider.  Document Released: 03/09/2005 Document Revised: 04/10/2017 Document Reviewed: 12/05/2014  [employer] Patient Education © 2017 [employer]      Depression / Suicide Risk    As you are discharged from this [facility] facility, it is important to learn how to keep safe from harming yourself.    Recognize the warning signs:  · Abrupt changes in personality, positive or negative- including increase in energy   · Giving away possessions  · Change in eating patterns- significant weight changes-  positive or negative  · Change in sleeping patterns- unable to  sleep or sleeping all the time   · Unwillingness or inability to communicate  · Depression  · Unusual sadness, discouragement and loneliness  · Talk of wanting to die  · Neglect of personal appearance   · Rebelliousness- reckless behavior  · Withdrawal from people/activities they love  · Confusion- inability to concentrate     If you or a loved one observes any of these behaviors or has concerns about self-harm, here's what you can do:  · Talk about it- your feelings and reasons for harming yourself  · Remove any means that you might use to hurt yourself (examples: pills, rope, extension cords, firearm)  · Get professional help from the community (Mental Health, Substance Abuse, psychological counseling)  · Do not be alone:Call your Safe Contact- someone whom you trust who will be there for you.  · Call your local CRISIS HOTLINE 806-8411 or 166-937-5817  · Call your local Children's Mobile Crisis Response Team [place] (601) 264-5727 or www.Virtual DBS  · Call the toll free National Suicide Prevention Hotlines   · National Suicide Prevention Lifeline 754-619-RRUY (2345)  · National Hope Line Network 800-SUICIDE (084-7625)

## 2019-05-16 NOTE — OP REPORT
DATE OF SERVICE:  05/15/2019    PREOPERATIVE DIAGNOSIS:  Recurrent paraesophageal hiatal hernia.    POSTOPERATIVE DIAGNOSIS:  Recurrent paraesophageal hiatal hernia.    PROCEDURES:  Laparoscopic repair of recurrent paraesophageal hiatal hernia   with mesh, redo fundoplication.    SURGEON:  John H. Ganser, MD    ASSISTANT:  Cr Norton PA-C    ANESTHESIA:  General.    ANESTHESIOLOGIST:  Maureen Grier MD    INDICATIONS:  The patient is a 58-year-old female who about 6 years ago   underwent repair of a large paraesophageal hiatal hernia with mesh.  She had a   recurrence requiring revision a year ago.  She has developed dysphagia and   pain with swallowing and has posterior slip of the wrap through the hiatus in   a paraesophageal location.  Risks, benefits and alternatives to laparoscopic   re-repair were outlined in detail.  All questions were answered and she wished   to proceed.    FINDINGS:  The wrap had slipped through the hiatus posteriorly in a   paraesophageal location.  The old mesh had slipped through the hiatus as well.    Old mesh was removed and hiatal repair carried out with a Phasix ST mesh   support and redo of a 270-degree fundoplication over a 56-[place] bougie.    DESCRIPTION OF PROCEDURE:  Patient was identified and general anesthetic   administered.  Her abdomen was prepped and draped in the usual sterile   fashion.  Local anesthesia of 0.5% Marcaine with epinephrine was injected   prior to making skin incision.  A small incision was made at the prior   laparoscopic site in the midepigastric region, the Veress needle passed.  The   abdomen was insufflated with carbon dioxide without incident and a 5 mm trocar   and 5 mm 30-degree scope inserted.  Mark retractor was passed through a   small subxiphoid incision, used to elevate the lateral segment of the liver   and this was held with the robotic arm.  Right and left upper quadrant trocars   were placed and a left lateral subcostal 5 mm  trocar.  Adhesions under the   lateral segment of liver were divided with cautery and the liver retractor   advanced.  Inspection of the hiatus showed the wrap herniating through the   hiatus posteriorly.  The old mesh appeared to have migrated through the hiatus   as well.  Careful dissection was carried out freeing adhesions   circumferentially around the hiatus.  The right posterior aspect of the mesh   was excised including all of its sutures.  The paraesophageal component was   able to be completely reduced.  The left side of the mesh was removed   partially as well leaving some of this well incorporated on the sandra muscle.    Circumferential dissection was then carried out completely mobilizing a   previous repair around the hiatus and circumferentially dissecting around the   esophagus allowing the esophagus to be brought well below the hiatus under no   tension upwards.    Posterior hiatal hernia repair was completed with 0 Surgidac using the   EndoStitch stitch device placing 3 sutures closing down the hiatus to the   normal size aperture.  A 7x10 cm Phasix ST long-term resorbable mesh was then   placed after being notched to accommodate the esophagus.  It was secured with   interrupted sutures to the hiatus.  Fundoplication was redone over a 56-Citizen of [place]   bougie placing 3 sutures on each side tacking the wrap down to the esophagus.    The posterior aspect of the mesh was secured down to the crural repair and   also to the posterior aspect of the wrap.  The wrap was also secured at   several points circumferentially to the mesh to prevent any reherniation.  The   abdomen was irrigated and hemostasis assured.  The bougie was removed and the   wrap maintained good orientation with no torsion or tension upwards.  The   abdomen was irrigated and hemostasis assured.  Liver retractor and trocars   were withdrawn.  The abdomen deflated and incisions closed with Vicryl.    Sterile dressings were applied.  Patient  returned to recovery room in stable   condition.       ____________________________________     JOHN H. GANSER, MD [facility] / [facility]    DD:  05/15/2019 16:00:22  DT:  05/15/2019 21:03:37    D#:  [id number]  Job#:  [id number]    cc: ALBA MENDEZ MD

## 2020-09-03 ENCOUNTER — HOSPITAL ENCOUNTER (OUTPATIENT)
Dept: RADIOLOGY | Facility: MEDICAL CENTER | Age: 60
End: 2020-09-03
Payer: COMMERCIAL

## (undated) DEVICE — TUBING CLEARLINK DUO-VENT - C-FLO (48EA/CA)

## (undated) DEVICE — SUTURE GENERAL

## (undated) DEVICE — TUBING LAPAROSCOPIC PLUME DEVICE (10EA/CA)

## (undated) DEVICE — TROCAR Z THREAD11MM OPTICAL - NON BLADED(6/BX)

## (undated) DEVICE — SODIUM CHL IRRIGATION 0.9% 1000ML (12EA/CA)

## (undated) DEVICE — GLOVE BIOGEL M SZ 8 SURGICAL PF LTX - (50/BX 4BX/CA)

## (undated) DEVICE — CHLORAPREP 26 ML APPLICATOR - ORANGE TINT(25/CA)

## (undated) DEVICE — HEAD HOLDER JUNIOR/ADULT

## (undated) DEVICE — MASK ANESTHESIA ADULT  - (100/CA)

## (undated) DEVICE — NEEDLE INSFL 120MM 14GA VRRS - (20/BX)

## (undated) DEVICE — TROCAR 5X100 NON BLADED Z-TH - READ KII (6/BX)

## (undated) DEVICE — SET SUCTION/IRRIGATION WITH DISPOSABLE TIP (6/CA )PART #0250-070-520 IS A SUB

## (undated) DEVICE — PROTECTOR ULNA NERVE - (36PR/CA)

## (undated) DEVICE — SET LEADWIRE 5 LEAD BEDSIDE DISPOSABLE ECG (1SET OF 5/EA)

## (undated) DEVICE — KIT ANESTHESIA W/CIRCUIT & 3/LT BAG W/FILTER (20EA/CA)

## (undated) DEVICE — KIT ROOM DECONTAMINATION

## (undated) DEVICE — POUCH 750ML TISSUE 5 X 8

## (undated) DEVICE — SPONGE GAUZESTER. 2X2 4-PL - (2/PK 50PK/BX 30BX/CS)

## (undated) DEVICE — SEALER VESSEL HARMONIC ACE PLUS WITH ADVANCED HEMOSTASIS 36CM (1/EA)

## (undated) DEVICE — CANNULA W/SEAL 5X100 Z-THRE - ADED KII (12/BX)

## (undated) DEVICE — SUCTION INSTRUMENT YANKAUER BULBOUS TIP W/O VENT (50EA/CA)

## (undated) DEVICE — NEPTUNE 4 PORT MANIFOLD - (20/PK)

## (undated) DEVICE — ENDOSTITCH LOAD UNIT 0 SURGI - 12/CA

## (undated) DEVICE — ELECTRODE 850 FOAM ADHESIVE - HYDROGEL RADIOTRNSPRNT (50/PK)

## (undated) DEVICE — SUTURE 4-0 VICRYL PLUS FS-2 - 27 INCH (36/BX)

## (undated) DEVICE — LACTATED RINGERS INJ 1000 ML - (14EA/CA 60CA/PF)

## (undated) DEVICE — SLEEVE, VASO, THIGH, MED

## (undated) DEVICE — GLOVE BIOGEL SZ 7.5 SURGICAL PF LTX - (50PR/BX 4BX/CA)

## (undated) DEVICE — ELECTRODE DUAL RETURN W/ CORD - (50/PK)

## (undated) DEVICE — CANISTER SUCTION 3000ML MECHANICAL FILTER AUTO SHUTOFF MEDI-VAC NONSTERILE LF DISP  (40EA/CA)

## (undated) DEVICE — SET EXTENSION WITH 2 PORTS (48EA/CA) ***PART #2C8610 IS A SUBSTITUTE*****

## (undated) DEVICE — GLOVE BIOGEL INDICATOR SZ 8 SURGICAL PF LTX - (50/BX 4BX/CA)

## (undated) DEVICE — GLOVE BIOGEL PI ORTHO SZ 6 SURGICAL PF LF (40PR/BX)

## (undated) DEVICE — ENDOSTITCH10MM SUTURING DEVIC - (3/CA)

## (undated) DEVICE — SENSOR SPO2 NEO LNCS ADHESIVE (20/BX) SEE USER NOTES

## (undated) DEVICE — DRESSING TRANSPARENT FILM TEGADERM 2.375 X 2.75"  (100EA/BX)"

## (undated) DEVICE — PACK LAP CHOLE OR - (2EA/CA)